# Patient Record
Sex: MALE | Race: WHITE | NOT HISPANIC OR LATINO | Employment: FULL TIME | URBAN - NONMETROPOLITAN AREA
[De-identification: names, ages, dates, MRNs, and addresses within clinical notes are randomized per-mention and may not be internally consistent; named-entity substitution may affect disease eponyms.]

---

## 2017-01-25 ENCOUNTER — OFFICE VISIT (OUTPATIENT)
Dept: CARDIOLOGY | Facility: CLINIC | Age: 67
End: 2017-01-25

## 2017-01-25 VITALS
HEIGHT: 69 IN | SYSTOLIC BLOOD PRESSURE: 132 MMHG | WEIGHT: 159 LBS | DIASTOLIC BLOOD PRESSURE: 90 MMHG | HEART RATE: 59 BPM | BODY MASS INDEX: 23.55 KG/M2

## 2017-01-25 DIAGNOSIS — I10 ESSENTIAL HYPERTENSION: Primary | ICD-10-CM

## 2017-01-25 DIAGNOSIS — R07.9 CHEST PAIN, UNSPECIFIED TYPE: ICD-10-CM

## 2017-01-25 PROCEDURE — 93000 ELECTROCARDIOGRAM COMPLETE: CPT | Performed by: INTERNAL MEDICINE

## 2017-01-25 PROCEDURE — 99203 OFFICE O/P NEW LOW 30 MIN: CPT | Performed by: INTERNAL MEDICINE

## 2017-01-25 RX ORDER — AMLODIPINE BESYLATE 10 MG/1
10 TABLET ORAL DAILY
Qty: 90 TABLET | Refills: 4 | OUTPATIENT
Start: 2017-01-25 | End: 2021-07-08

## 2017-01-25 NOTE — PROGRESS NOTES
[facility]  OFFICE NOTE    Chas Andino  66 y.o. male    01/25/2017  1. 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  He had bowel obstruction partial in the summertime and was in the hospital and was relieved without any surgical measures.  He had 4 episodes in the past  2 years.  Never had any abdominal surgery.  He had atypical type of chest pain never had any stress test within the last 5 years.  Never had any cardiac problems in the past.  His father had congestive heart failure and hypertension along with peripheral vascular disease.  No history of diabetes in the family.        No Known Allergies      Past Medical History   Diagnosis Date   • Hypertension          Past Surgical History   Procedure Laterality Date   • Total knee arthroplasty           Family History   Problem Relation Age of Onset   • Hypertension Father    • Heart disease Father          Social History     Social History   • Marital status:      Spouse name: N/A   • Number of children: N/A   • Years of education: N/A     Occupational History   • Not on file.     Social History Main Topics   • Smoking status: Never Smoker   • Smokeless tobacco: Never Used   • Alcohol use No      Comment: social   • Drug use: No   • Sexual activity: Defer     Other Topics Concern   • Not on file     Social History Narrative   • No narrative on file         Current Outpatient Prescriptions   Medication Sig Dispense Refill   • amLODIPine (NORVASC) 10 MG tablet Take 1 tablet by mouth Daily. 90 tablet 4   • zolpidem (AMBIEN) 10 MG tablet Take 1 tablet by mouth Every Night.     • lisinopril 10 MG tablet 20 mg, hydrochlorothiazide 25 MG  "tablet 12.5 mg 20/12.5 mg 90 capsule 4     No current facility-administered medications for this visit.          Review of Systems     Constitution: Denies any fatigue, fever or chills    HENT: Denies any headache, hearing impairment,     Eyes: Denies any blurring of vision, or photophobia     Cardivascular - As per history of present illness     Respiratory system-denies any COPD, shortness of breath,   sleep apnea.     Endocrine:  No history of hyperlipidemia, diabetes,                      Hypothyrodism       Musculoskeletal:   history of arthritis with musculoskeletal problems with knee replacement    Gastrointestinal: No nausea, vomiting, or melena    Genitourinary: No dysuria or hematuria    Neurological:   No history of seizure disorder, stroke, memory problems    Psychiatric/Behavioral:        No history of depression,  No history of bipolar disorder or schizophrenia     Hematological- no history of easy bruising or any bleeding diathesis            OBJECTIVE    Visit Vitals   • /90   • Pulse 59   • Ht 69\" (175.3 cm)   • Wt 159 lb (72.1 kg)   • BMI 23.48 kg/m2         Physical Exam     Constitutional: is oriented to person, place, and time.     Skin-warm and dry    Well developed and nourished in no acute distress      Head: Normocephalic and atraumatic.     Eyes: Pupils are equal, round, and reactive to light.     Neck: Neck supple. No bruit in the carotids, no elevation of JVD    Cardiovascular: [place] in the fifth intercostal space   Regular rate, and  Rhythm,    S1 greater than S2, no S3 or S4, no gallop     Pulmonary/Chest:   Air  Entry is equal on both sides  No wheezing or crackles,      Abdominal: Soft.  No hepatosplenomegaly, bowel sounds are present    Musculoskeletal: No kyphoscoliosis, no significant thickening of the joints    Neurological: is alert and oriented to person, place, and time.    cranial nerve are intact .   No motor or sensory deficit    Extremities-no edema, no radial femoral " delay      Psychiatric: He has a normal mood and affect.                  His behavior is normal.             ECG 12 Lead  Date/Time: 1/25/2017 1:57 PM  Performed by: SUSANNE VASQUEZ  Authorized by: SUSANNE VASQUEZ   Comparison: not compared with previous ECG   Rhythm: sinus rhythm and sinus bradycardia  Clinical impression: normal ECG              Lab Results   Component Value Date    WBC 10.2 (H) 10/02/2016    HGB 14.3 10/02/2016    HCT 40.8 10/02/2016    MCV 92.9 10/02/2016     10/02/2016     Lab Results   Component Value Date    GLUCOSE 90 10/02/2016    BUN 14 10/02/2016    CREATININE 0.9 10/02/2016    CO2 28 10/02/2016    CALCIUM 9.1 10/02/2016    ALBUMIN 4.8 10/01/2016    AST 30 10/01/2016    ALT 32 10/01/2016                  A/P    Labile hypertension, was on Bystolic and amlodipine, feeling tired with fatigue with bradycardia with Bystolic.  Has been on Zestoretic before.  And had been on amlodipine 10 mg daily.  I stopped the Bystolic and added Zestoretic 20/12.5 mg daily in the morning and amlodipine 10 mg in the evening and will monitor the blood pressure for the next 2 weeks.    Long-standing history of hypertension we'll check an echo to assess LV function and left ventricular thickness.  Will also get a exercise treadmill to assess functional capacity as he had atypical chest pain probably related to labile hypertension.  Will get labs regarding the LFTs electrolytes and lipid panel.  Will monitor the blood pressure for the next 2 weeks and follow-up in 3 weeks      Susanne Vasquez MD  1/25/2017  1:52 PM    EMR Dragon/Transcription disclaimer:   Some of this note may be an electronic transcription/translation of spoken language to printed text. The electronic translation of spoken language may permit erroneous, or at times, nonsensical words or phrases to be inadvertently transcribed; Although I have reviewed the note for such errors, some may still exist.

## 2017-01-25 NOTE — MR AVS SNAPSHOT
Chas Andino   1/25/2017 1:15 PM   Office Visit    Dept Phone:  [phone]   Encounter #:  [id number]    Provider:  Philippe Petersen MD   Department:  [facility] CARDIOLOGY                Your Full Care Plan              Today's Medication Changes          These changes are accurate as of: 1/25/17  1:55 PM.  If you have any questions, ask your nurse or doctor.               New Medication(s)Ordered:     lisinopril 10 MG tablet 20 mg, hydrochlorothiazide 25 MG tablet 12.5 mg   20/12.5 mg   Started by:  Philippe Petersen MD         Medication(s)that have changed:     amLODIPine 10 MG tablet   Commonly known as:  NORVASC   Take 1 tablet by mouth Daily.   What changed:  when to take this   Changed by:  Philippe Petersen MD         Stop taking medication(s)listed here:     BYSTOLIC 10 MG tablet   Generic drug:  nebivolol   Stopped by:  Philippe Petersen MD           BYSTOLIC 20 MG tablet   Generic drug:  nebivolol   Stopped by:  Philippe Petersen MD           hydrochlorothiazide 25 MG tablet   Commonly known as:  HYDRODIURIL   Stopped by:  Philippe Petersen MD           HYDROcodone-acetaminophen 5-325 MG per tablet   Commonly known as:  NORCO   Stopped by:  Philippe Petersen MD           lisinopril 20 MG tablet   Commonly known as:  PRINIVIL,ZESTRIL   Stopped by:  Philippe Petersen MD           predniSONE 20 MG tablet   Commonly known as:  DELTASONE   Stopped by:  Philippe Petersen MD                Where to Get Your Medications      These medications were sent to [facility] [place] [id number] - [phone]  - [phone] [address] [address]     Phone:  [phone]     amLODIPine 10 MG tablet    lisinopril 10 MG tablet 20 mg, hydrochlorothiazide 25 MG tablet 12.5 mg                  Your Updated Medication List          This list is accurate as of: 1/25/17  1:55 PM.  Always use your most recent med list.                 amLODIPine 10 MG tablet   Commonly known as:  NORVASC   Take 1 tablet by mouth Daily.       lisinopril 10 MG tablet 20 mg, hydrochlorothiazide 25 MG tablet 12.5 mg   20/12.5 mg       zolpidem 10 MG tablet   Commonly known as:  AMBIEN               We Performed the Following     Adult Transthoracic Echo Complete     CBC (No Diff)     Comprehensive Metabolic Panel     Lipid Panel     T4     Treadmill Stress Test     Uric Acid       You Were Diagnosed With        Codes Comments    Essential hypertension    -  Primary ICD-10-CM: I10  ICD-9-CM: 401.9       Instructions     None    Patient Instructions History      Upcoming Appointments     Visit Type Date Time Department    NEW PATIENT 2017  1:15 PM [facility] HEART [facility] STRESS TEST ONLY [place] 2017  3:30 PM [facility] ECHO 2D COMPLETE [place] 2017  3:00 PM [facility]    OFFICE VISIT 3/1/2017  1:30 PM [facility]      [employer] Signup     [facility] [employer] allows you to send messages to your doctor, view your test results, renew your prescriptions, schedule appointments, and more. To sign up, go to [employer] and click on the Sign Up Now link in the New User? box. Enter your [employer] Activation Code exactly as it appears below along with the last four digits of your Social Security Number and your Date of Birth () to complete the sign-up process. If you do not sign up before the expiration date, you must request a new code.    [employer] Activation Code: [email or web address]  Expires: 2017  1:55 PM    If you have questions, you can email [email or web address] or call [phone] to talk to our [employer] staff. Remember, [employer] is NOT to be used for urgent needs. For medical emergencies, dial 911.               Other Info from Your Visit           Your Appointments     2017  3:00 PM CST   [facility] [facility]OLOGY (--)    [address]  [facility] [address] [phone]           Bring your insurance cards with you. Wear comfortable clothing and shoes.            Feb 22, 2017  3:30 PM CST   STRESS TEST ONLY VISIT with Philippe Petersen MD   [facility] CARDIOLOGY (--)    [address]  [facility] [address] [phone]           No food or drink for 2 hours prior to your test. No caffeine or chocolate 24 hours prior to you test. (this includes coffee, tea, soda, all decaffeinated beverages, cappuccino flavorings, noooz or vivarian, diet medications, excedrin, anacin or any energy drinks) wear comfortable clothing and walking shoes. Bring your insurance cards with you. Bring all medications in their original bottles. If you are diabetic, do not take your diabetic medications after consulting with your primary care physician. if you take insulin, only take half the dose after consulting with your primary care physician. please hold the following medications for 48 hours prior to your test after consulting with your primary care physician. (acebutolo, aggrenox, atenolol, bisoprolol, betaxolol, betapace, calan, cardizem, carvadilol, coreg, corgard, corzide, dilacor xr, diltiazem, inderal, inderal la, isoptin, karlone, labetolol, levatol, nadolol, normodyne, penbutolol, pindolol, propanolol, sectral, sotalol, tenoretic, tiazic, timolol, bystolic, toprol xl, lopressor, metoprolol, trandata, verapamil, verelan, visken, zebeta, zisc)            Mar 01, 2017  1:30 PM CST   Office Visit with Philippe Petersen MD   [facility] CARDIOLOGY (--)    [address] [address] [phone]           Arrive 15 minutes prior to appointment.              Allergies     No Known Allergies      Vital Signs     Blood Pressure Pulse Height Weight Body Mass Index Smoking Status    132/90 59 69\" (175.3 cm) 159 lb (72.1 kg) 23.48 " kg/m2 Never Smoker      Problems and Diagnoses Noted     High blood pressure

## 2017-02-21 ENCOUNTER — APPOINTMENT (OUTPATIENT)
Dept: CT IMAGING | Facility: HOSPITAL | Age: 67
End: 2017-02-21

## 2017-02-21 ENCOUNTER — HOSPITAL ENCOUNTER (EMERGENCY)
Facility: HOSPITAL | Age: 67
Discharge: HOME | End: 2017-02-21
Attending: EMERGENCY MEDICINE | Admitting: EMERGENCY MEDICINE

## 2017-02-21 ENCOUNTER — APPOINTMENT (OUTPATIENT)
Dept: GENERAL RADIOLOGY | Facility: HOSPITAL | Age: 67
End: 2017-02-21

## 2017-02-21 VITALS
HEIGHT: 69 IN | HEART RATE: 70 BPM | BODY MASS INDEX: 23.7 KG/M2 | RESPIRATION RATE: 18 BRPM | SYSTOLIC BLOOD PRESSURE: 126 MMHG | TEMPERATURE: 97.6 F | DIASTOLIC BLOOD PRESSURE: 66 MMHG | OXYGEN SATURATION: 98 % | WEIGHT: 160 LBS

## 2017-02-21 DIAGNOSIS — J32.9 SINUSITIS, UNSPECIFIED CHRONICITY, UNSPECIFIED LOCATION: ICD-10-CM

## 2017-02-21 DIAGNOSIS — R42 VERTIGO: Primary | ICD-10-CM

## 2017-02-21 LAB
ALBUMIN SERPL-MCNC: 4.5 G/DL (ref 3.4–4.8)
ALBUMIN/GLOB SERPL: 1.5 G/DL (ref 1.1–1.8)
ALP SERPL-CCNC: 39 U/L (ref 38–126)
ALT SERPL W P-5'-P-CCNC: 28 U/L (ref 21–72)
ANION GAP SERPL CALCULATED.3IONS-SCNC: 10 MMOL/L (ref 5–15)
AST SERPL-CCNC: 27 U/L (ref 17–59)
BASOPHILS # BLD AUTO: 0.02 10*3/MM3 (ref 0–0.2)
BASOPHILS NFR BLD AUTO: 0.3 % (ref 0–2)
BILIRUB SERPL-MCNC: 0.7 MG/DL (ref 0.2–1.3)
BUN BLD-MCNC: 14 MG/DL (ref 7–21)
BUN/CREAT SERPL: 17.1 (ref 7–25)
CALCIUM SPEC-SCNC: 9.4 MG/DL (ref 8.4–10.2)
CHLORIDE SERPL-SCNC: 88 MMOL/L (ref 95–110)
CO2 SERPL-SCNC: 30 MMOL/L (ref 22–31)
CREAT BLD-MCNC: 0.82 MG/DL (ref 0.7–1.3)
DEPRECATED RDW RBC AUTO: 37.7 FL (ref 35.1–43.9)
EOSINOPHIL # BLD AUTO: 0.04 10*3/MM3 (ref 0–0.7)
EOSINOPHIL NFR BLD AUTO: 0.6 % (ref 0–7)
ERYTHROCYTE [DISTWIDTH] IN BLOOD BY AUTOMATED COUNT: 11.6 % (ref 11.5–14.5)
GFR SERPL CREATININE-BSD FRML MDRD: 94 ML/MIN/1.73 (ref 49–113)
GLOBULIN UR ELPH-MCNC: 3.1 GM/DL (ref 2.3–3.5)
GLUCOSE BLD-MCNC: 90 MG/DL (ref 60–100)
HCT VFR BLD AUTO: 41 % (ref 39–49)
HGB BLD-MCNC: 14.9 G/DL (ref 13.7–17.3)
HOLD SPECIMEN: NORMAL
HOLD SPECIMEN: NORMAL
IMM GRANULOCYTES # BLD: 0.03 10*3/MM3 (ref 0–0.02)
IMM GRANULOCYTES NFR BLD: 0.5 % (ref 0–0.5)
LYMPHOCYTES # BLD AUTO: 0.92 10*3/MM3 (ref 0.6–4.2)
LYMPHOCYTES NFR BLD AUTO: 14.3 % (ref 10–50)
MCH RBC QN AUTO: 32.2 PG (ref 26.5–34)
MCHC RBC AUTO-ENTMCNC: 36.3 G/DL (ref 31.5–36.3)
MCV RBC AUTO: 88.6 FL (ref 80–98)
MONOCYTES # BLD AUTO: 0.53 10*3/MM3 (ref 0–0.9)
MONOCYTES NFR BLD AUTO: 8.2 % (ref 0–12)
NEUTROPHILS # BLD AUTO: 4.9 10*3/MM3 (ref 2–8.6)
NEUTROPHILS NFR BLD AUTO: 76.1 % (ref 37–80)
PLATELET # BLD AUTO: 323 10*3/MM3 (ref 150–450)
PMV BLD AUTO: 9.1 FL (ref 8–12)
POTASSIUM BLD-SCNC: 3.8 MMOL/L (ref 3.5–5.1)
PROT SERPL-MCNC: 7.6 G/DL (ref 6.3–8.6)
RBC # BLD AUTO: 4.63 10*6/MM3 (ref 4.37–5.74)
SODIUM BLD-SCNC: 128 MMOL/L (ref 137–145)
WBC NRBC COR # BLD: 6.44 10*3/MM3 (ref 3.2–9.8)
WHOLE BLOOD HOLD SPECIMEN: NORMAL
WHOLE BLOOD HOLD SPECIMEN: NORMAL

## 2017-02-21 PROCEDURE — 80053 COMPREHEN METABOLIC PANEL: CPT | Performed by: EMERGENCY MEDICINE

## 2017-02-21 PROCEDURE — 99284 EMERGENCY DEPT VISIT MOD MDM: CPT

## 2017-02-21 PROCEDURE — 70450 CT HEAD/BRAIN W/O DYE: CPT

## 2017-02-21 PROCEDURE — 93010 ELECTROCARDIOGRAM REPORT: CPT | Performed by: INTERNAL MEDICINE

## 2017-02-21 PROCEDURE — 93005 ELECTROCARDIOGRAM TRACING: CPT | Performed by: EMERGENCY MEDICINE

## 2017-02-21 PROCEDURE — 71010 HC CHEST PA OR AP: CPT

## 2017-02-21 PROCEDURE — 85025 COMPLETE CBC W/AUTO DIFF WBC: CPT | Performed by: EMERGENCY MEDICINE

## 2017-02-21 RX ORDER — AZITHROMYCIN 250 MG/1
TABLET, FILM COATED ORAL
Qty: 6 TABLET | Refills: 0 | Status: SHIPPED | OUTPATIENT
Start: 2017-02-21 | End: 2017-03-01

## 2017-02-21 RX ORDER — SODIUM CHLORIDE 0.9 % (FLUSH) 0.9 %
10 SYRINGE (ML) INJECTION AS NEEDED
Status: DISCONTINUED | OUTPATIENT
Start: 2017-02-21 | End: 2017-02-21 | Stop reason: HOSPADM

## 2017-02-21 RX ORDER — MECLIZINE HYDROCHLORIDE 25 MG/1
25 TABLET ORAL 3 TIMES DAILY PRN
Qty: 15 TABLET | Refills: 0 | Status: SHIPPED | OUTPATIENT
Start: 2017-02-21 | End: 2017-03-01

## 2017-02-21 RX ORDER — MECLIZINE HYDROCHLORIDE 25 MG/1
25 TABLET ORAL ONCE
Status: COMPLETED | OUTPATIENT
Start: 2017-02-21 | End: 2017-02-21

## 2017-02-21 RX ADMIN — MECLIZINE HYDROCHLORIDE 25 MG: 25 TABLET ORAL at 12:43

## 2017-02-21 NOTE — ED PROVIDER NOTES
Subjective   Patient is a 66 y.o. male presenting with dizziness.   Dizziness   Quality:  Head spinning  Severity:  Severe  Onset quality:  Sudden  Duration:  1 month  Timing:  Intermittent  Progression:  Waxing and waning  Chronicity:  Recurrent  Context: head movement and physical activity    Context: not when bending over, not with bowel movement, not with ear pain, not with eye movement, not with inactivity, not with loss of consciousness, not with medication, not when standing up and not when urinating    Relieved by:  Nothing  Worsened by:  Movement  Ineffective treatments:  None tried  Associated symptoms: no blood in stool, no chest pain, no diarrhea, no headaches, no hearing loss, no nausea, no palpitations, no shortness of breath, no syncope, no tinnitus, no vision changes, no vomiting and no weakness    Risk factors: no anemia, no heart disease, no hx of stroke, no hx of vertigo, no Meniere's disease, no multiple medications and no new medications        Review of Systems   Constitutional: Negative.    HENT: Negative.  Negative for hearing loss and tinnitus.    Eyes: Negative.    Respiratory: Negative.  Negative for shortness of breath.    Cardiovascular: Negative.  Negative for chest pain, palpitations and syncope.   Gastrointestinal: Negative.  Negative for blood in stool, diarrhea, nausea and vomiting.   Musculoskeletal: Negative.    Skin: Negative.    Neurological: Positive for dizziness. Negative for weakness and headaches.       Past Medical History   Diagnosis Date   • Bowel obstruction    • Cervical cyst      C7   • Hypertension        No Known Allergies    Past Surgical History   Procedure Laterality Date   • Total knee arthroplasty         Family History   Problem Relation Age of Onset   • Hypertension Father    • Heart disease Father        Social History     Social History   • Marital status:      Spouse name: N/A   • Number of children: N/A   • Years of education: N/A     Social  History Main Topics   • Smoking status: Never Smoker   • Smokeless tobacco: Never Used   • Alcohol use No      Comment: social   • Drug use: No   • Sexual activity: Defer     Other Topics Concern   • None     Social History Narrative           Objective   Physical Exam   Constitutional: He is oriented to person, place, and time. He appears well-developed and well-nourished.   HENT:   Head: Normocephalic and atraumatic.   Mouth/Throat: Oropharynx is clear and moist.   Eyes: Conjunctivae and EOM are normal. Pupils are equal, round, and reactive to light.   Neck: Normal range of motion. Neck supple.   Cardiovascular: Normal rate, regular rhythm and normal heart sounds.  Exam reveals no gallop and no friction rub.    No murmur heard.  Pulmonary/Chest: Effort normal and breath sounds normal. He has no wheezes. He has no rales.   Abdominal: Soft. Bowel sounds are normal. He exhibits no mass. There is no tenderness. There is no rebound and no guarding.   Musculoskeletal: Normal range of motion. He exhibits no tenderness.   Neurological: He is alert and oriented to person, place, and time. He has normal reflexes. No cranial nerve deficit.   Skin: Skin is warm and dry.   Nursing note and vitals reviewed.      ECG 12 Lead    Date/Time: 2/21/2017 9:34 AM  Performed by: CHERYL BUTLER  Authorized by: CHERYL BUTLER   Interpreted by physician  Comparison: not compared with previous ECG   Rhythm: sinus rhythm  Rate: normal  QRS axis: normal  Conduction: conduction normal  ST Segments: ST segments normal  T Waves: T waves normal  Other: no other findings  Clinical impression: normal ECG               ED Course  ED Course      Labs Reviewed   COMPREHENSIVE METABOLIC PANEL - Abnormal; Notable for the following:        Result Value    Sodium 128 (*)     Chloride 88 (*)     All other components within normal limits   CBC WITH AUTO DIFFERENTIAL - Abnormal; Notable for the following:     Immature Grans, Absolute 0.03 (*)     All other  components within normal limits   RAINBOW DRAW    Narrative:     The following orders were created for panel order [place] Draw.  Procedure                               Abnormality         Status                     ---------                               -----------         ------                     Light Blue Top[[id number]]                                    Final result               Green Top (Gel)[[id number]]                                   Final result               Lavender Top[[id number]]                                      Final result               Gold Top - SST[[id number]]                                    Final result                 Please view results for these tests on the individual orders.   CBC AND DIFFERENTIAL    Narrative:     The following orders were created for panel order CBC & Differential.  Procedure                               Abnormality         Status                     ---------                               -----------         ------                     CBC Auto Differential[[id number]]         Abnormal            Final result                 Please view results for these tests on the individual orders.   LIGHT BLUE TOP   GREEN TOP   LAVENDER TOP   GOLD TOP - SST       CT Head Without Contrast   Final Result   No evidence of intracranial hemorrhage, mass effect or large   acute infarct.   Left ethmoid sinus disease.      Electronically signed by:  Paul Rodriguez MD  2/21/2017 10:21 AM   CST Workstation: [id number]      XR Chest 1 View   Final Result   CONCLUSION:   No acute pulmonary disease.      Electronically signed by:  Paul Rodriguez MD  2/21/2017 10:01 AM   CST Workstation: [id number]        Labs Reviewed   COMPREHENSIVE METABOLIC PANEL - Abnormal; Notable for the following:        Result Value    Sodium 128 (*)     Chloride 88 (*)     All other components within normal limits   CBC WITH AUTO DIFFERENTIAL - Abnormal; Notable for the following:     Immature Grans, Absolute 0.03  (*)     All other components within normal limits   RAINBOW DRAW    Narrative:     The following orders were created for panel order [place] Draw.  Procedure                               Abnormality         Status                     ---------                               -----------         ------                     Light Blue Top[[id number]]                                    Final result               Green Top (Gel)[[id number]]                                   Final result               Lavender Top[[id number]]                                      Final result               Gold Top - SST[[id number]]                                    Final result                 Please view results for these tests on the individual orders.   CBC AND DIFFERENTIAL    Narrative:     The following orders were created for panel order CBC & Differential.  Procedure                               Abnormality         Status                     ---------                               -----------         ------                     CBC Auto Differential[[id number]]         Abnormal            Final result                 Please view results for these tests on the individual orders.   LIGHT BLUE TOP   GREEN TOP   LAVENDER TOP   GOLD TOP - SST       CT Head Without Contrast   Final Result   No evidence of intracranial hemorrhage, mass effect or large   acute infarct.   Left ethmoid sinus disease.      Electronically signed by:  Paul Rodriguez MD  2/21/2017 10:21 AM   CST Workstation: [id number]      XR Chest 1 View   Final Result   CONCLUSION:   No acute pulmonary disease.      Electronically signed by:  Paul Rodriguez MD  2/21/2017 10:01 AM   CST Workstation: [id number]      spoke with Dr Davis and reviewed findings.  Pt to have MRI tomorrow          MDM      Final diagnoses:   Vertigo   Sinusitis, unspecified chronicity, unspecified location            Hector Oshea MD  03/1950

## 2017-02-22 LAB
BH CV ECHO MEAS - ACS: 1.6 CM
BH CV ECHO MEAS - AO MAX PG (FULL): 2.3 MMHG
BH CV ECHO MEAS - AO MAX PG: 8.5 MMHG
BH CV ECHO MEAS - AO MEAN PG (FULL): 2 MMHG
BH CV ECHO MEAS - AO MEAN PG: 4 MMHG
BH CV ECHO MEAS - AO ROOT AREA (BSA CORRECTED): 1.5
BH CV ECHO MEAS - AO ROOT AREA: 6.6 CM^2
BH CV ECHO MEAS - AO ROOT DIAM: 2.9 CM
BH CV ECHO MEAS - AO V2 MAX: 146 CM/SEC
BH CV ECHO MEAS - AO V2 MEAN: 87.8 CM/SEC
BH CV ECHO MEAS - AO V2 VTI: 29 CM
BH CV ECHO MEAS - BSA(HAYCOCK): 1.9 M^2
BH CV ECHO MEAS - BSA: 1.9 M^2
BH CV ECHO MEAS - BZI_BMI: 23.6 KILOGRAMS/M^2
BH CV ECHO MEAS - BZI_METRIC_HEIGHT: 175.3 CM
BH CV ECHO MEAS - BZI_METRIC_WEIGHT: 72.6 KG
BH CV ECHO MEAS - EDV(CUBED): 140.6 ML
BH CV ECHO MEAS - EDV(TEICH): 129.5 ML
BH CV ECHO MEAS - EF(CUBED): 64 %
BH CV ECHO MEAS - EF(TEICH): 55.1 %
BH CV ECHO MEAS - EPSS: 0.8 CM
BH CV ECHO MEAS - ESV(CUBED): 50.7 ML
BH CV ECHO MEAS - ESV(TEICH): 58.1 ML
BH CV ECHO MEAS - FS: 28.8 %
BH CV ECHO MEAS - IVS/LVPW: 0.69
BH CV ECHO MEAS - IVSD: 0.9 CM
BH CV ECHO MEAS - LA DIMENSION: 4.1 CM
BH CV ECHO MEAS - LA/AO: 1.4
BH CV ECHO MEAS - LV MASS(C)D: 220.8 GRAMS
BH CV ECHO MEAS - LV MASS(C)DI: 117.5 GRAMS/M^2
BH CV ECHO MEAS - LV MAX PG: 6.3 MMHG
BH CV ECHO MEAS - LV MEAN PG: 2 MMHG
BH CV ECHO MEAS - LV V1 MAX: 125 CM/SEC
BH CV ECHO MEAS - LV V1 MEAN: 66.4 CM/SEC
BH CV ECHO MEAS - LV V1 VTI: 24.6 CM
BH CV ECHO MEAS - LVIDD: 5.2 CM
BH CV ECHO MEAS - LVIDS: 3.7 CM
BH CV ECHO MEAS - LVPWD: 1.3 CM
BH CV ECHO MEAS - MR MAX PG: 97.2 MMHG
BH CV ECHO MEAS - MR MAX VEL: 493 CM/SEC
BH CV ECHO MEAS - MV A MAX VEL: 116 CM/SEC
BH CV ECHO MEAS - MV E MAX VEL: 81.4 CM/SEC
BH CV ECHO MEAS - MV E/A: 0.7
BH CV ECHO MEAS - PA MAX PG: 3.7 MMHG
BH CV ECHO MEAS - PA MEAN PG: 2 MMHG
BH CV ECHO MEAS - PA V2 MAX: 96.5 CM/SEC
BH CV ECHO MEAS - PA V2 MEAN: 57.2 CM/SEC
BH CV ECHO MEAS - PA V2 VTI: 19.1 CM
BH CV ECHO MEAS - RAP SYSTOLE: 10 MMHG
BH CV ECHO MEAS - RVDD: 1.5 CM
BH CV ECHO MEAS - RVSP: 31.1 MMHG
BH CV ECHO MEAS - SI(AO): 102 ML/M^2
BH CV ECHO MEAS - SI(CUBED): 47.9 ML/M^2
BH CV ECHO MEAS - SI(TEICH): 38 ML/M^2
BH CV ECHO MEAS - SV(AO): 191.6 ML
BH CV ECHO MEAS - SV(CUBED): 90 ML
BH CV ECHO MEAS - SV(TEICH): 71.4 ML
BH CV ECHO MEAS - TR MAX VEL: 218.5 CM/SEC
BH CV STRESS DURATION MIN STAGE 1: 3
BH CV STRESS DURATION SEC STAGE 1: 0
BH CV STRESS GRADE STAGE 1: 10
BH CV STRESS METS STAGE 1: 5
BH CV STRESS PROTOCOL 1: NORMAL
BH CV STRESS RECOVERY BP: NORMAL MMHG
BH CV STRESS RECOVERY HR: 86 BPM
BH CV STRESS SPEED STAGE 1: 1.7
BH CV STRESS STAGE 1: 1
LV EF 2D ECHO EST: 60 %
MAXIMAL PREDICTED HEART RATE: 155 BPM
PERCENT MAX PREDICTED HR: 80.65 %
STRESS BASELINE BP: NORMAL MMHG
STRESS BASELINE HR: 81 BPM
STRESS PERCENT HR: 95 %
STRESS POST PEAK BP: NORMAL MMHG
STRESS POST PEAK HR: 125 BPM
STRESS TARGET HR: 132 BPM

## 2017-03-01 ENCOUNTER — OFFICE VISIT (OUTPATIENT)
Dept: CARDIOLOGY | Facility: CLINIC | Age: 67
End: 2017-03-01

## 2017-03-01 VITALS
HEART RATE: 64 BPM | HEIGHT: 69 IN | WEIGHT: 157 LBS | SYSTOLIC BLOOD PRESSURE: 118 MMHG | DIASTOLIC BLOOD PRESSURE: 64 MMHG | BODY MASS INDEX: 23.25 KG/M2

## 2017-03-01 DIAGNOSIS — I10 ESSENTIAL HYPERTENSION: Primary | ICD-10-CM

## 2017-03-01 PROCEDURE — 99212 OFFICE O/P EST SF 10 MIN: CPT | Performed by: INTERNAL MEDICINE

## 2017-03-01 NOTE — PROGRESS NOTES
[facility]  OFFICE NOTE    Chas Andino  66 y.o. male    03/01/2017  1. 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  And I had stent adjusted his blood pressure medicine and blood pressure staying good with Aldactazide half a tablet in the morning along with lisinopril 40 mg in the morning and amlodipine 10 mill grams the evening.  He had his kidney function checked on 21st of this month.  We'll check another BMP in 1 week as he is on Aldactazide.  If that is okay then we'll continue Aldactazide regularly.              No Known Allergies      Past Medical History   Diagnosis Date   • Bowel obstruction    • Cervical cyst      C7   • Hypertension          Past Surgical History   Procedure Laterality Date   • Total knee arthroplasty           Family History   Problem Relation Age of Onset   • Hypertension Father    • Heart disease Father          Social History     Social History   • Marital status:      Spouse name: N/A   • Number of children: N/A   • Years of education: N/A     Occupational History   • Not on file.     Social History Main Topics   • Smoking status: Never Smoker   • Smokeless tobacco: Never Used   • Alcohol use No      Comment: social   • Drug use: No   • Sexual activity: Defer     Other Topics Concern   • Not on file     Social History Narrative         Current Outpatient Prescriptions   Medication Sig Dispense Refill   • amLODIPine (NORVASC) 10 MG tablet Take 1 tablet by mouth Daily. 90 tablet 4   • lisinopril (PRINIVIL,ZESTRIL) 40 MG tablet Take 1 tablet by mouth Daily. 30 tablet 11   • spironolactone-hydrochlorothiazide (ALDACTAZIDE) 25-25 MG tablet Take 0.5 tablets by mouth Daily. 30 tablet 12   • Vortioxetine HBr (TRINTELLIX PO) Take  by mouth.     • zolpidem (AMBIEN) 10 MG tablet Take 1 tablet by mouth Every Night.    "    No current facility-administered medications for this visit.          Review of Systems     Constitution: Denies any fatigue, fever or chills    HENT: Denies any headache, hearing impairment,     Eyes: Denies any blurring of vision, or photophobia     Cardivascular - As per history of present illness     Respiratory system-denies any COPD, shortness of breath,   sleep apnea.     Endocrine:  No history of hyperlipidemia, diabetes,                      Hypothyrodism       Musculoskeletal:  No history of arthritis with musculoskeletal problems    Gastrointestinal: No nausea, vomiting, or melena    Genitourinary: No dysuria or hematuria    Neurological:   No history of seizure disorder, stroke, memory problems    Psychiatric/Behavioral:        No history of depression,  No history of bipolar disorder or schizophrenia     Hematological- no history of easy bruising or any bleeding diathesis            OBJECTIVE    Visit Vitals   • /64   • Pulse 64   • Ht 69\" (175.3 cm)   • Wt 157 lb (71.2 kg)   • BMI 23.18 kg/m2         Physical Exam     Constitutional: is oriented to person, place, and time.     Skin-warm and dry    Well developed and nourished in no acute distress      Head: Normocephalic and atraumatic.     Eyes: Pupils are equal, round, and reactive to light.     Neck: Neck supple. No bruit in the carotids, no elevation of JVD    Cardiovascular: [place] in the fifth intercostal space   Regular rate, and  Rhythm,    S1 greater than S2, no S3 or S4, no gallop     Pulmonary/Chest:   Air  Entry is equal on both sides  No wheezing or crackles,      Abdominal: Soft.  No hepatosplenomegaly, bowel sounds are present    Musculoskeletal: No kyphoscoliosis, no significant thickening of the joints    Neurological: is alert and oriented to person, place, and time.    cranial nerve are intact .   No motor or sensory deficit    Extremities-no edema, no radial femoral delay      Psychiatric: He has a normal mood and affect.    "               His behavior is normal.           Procedures      Lab Results   Component Value Date    WBC 6.44 02/21/2017    HGB 14.9 02/21/2017    HCT 41.0 02/21/2017    MCV 88.6 02/21/2017     02/21/2017     Lab Results   Component Value Date    GLUCOSE 90 02/21/2017    BUN 14 02/21/2017    CREATININE 0.82 02/21/2017    EGFRIFNONA 94 02/21/2017    BCR 17.1 02/21/2017    CO2 30.0 02/21/2017    CALCIUM 9.4 02/21/2017    ALBUMIN 4.50 02/21/2017    LABIL2 1.5 02/21/2017    AST 27 02/21/2017    ALT 28 02/21/2017                  A/P    Hypertension-well controlled with Aldactazide half a tablet, lisinopril 40 mg and amlodipine 10 mg in the evening.  We'll continue the current medications and he'll follow with me as necessary.  Schedule for a BMP in 1 week.      Philippe Petersen MD  3/1/2017  2:06 PM    EMR Dragon/Transcription disclaimer:   Some of this note may be an electronic transcription/translation of spoken language to printed text. The electronic translation of spoken language may permit erroneous, or at times, nonsensical words or phrases to be inadvertently transcribed; Although I have reviewed the note for such errors, some may still exist.

## 2017-03-17 ENCOUNTER — APPOINTMENT (OUTPATIENT)
Dept: LAB | Facility: HOSPITAL | Age: 67
End: 2017-03-17

## 2017-03-17 DIAGNOSIS — I10 ESSENTIAL HYPERTENSION: Primary | ICD-10-CM

## 2017-03-17 LAB
ANION GAP SERPL CALCULATED.3IONS-SCNC: 10 MMOL/L (ref 5–15)
BUN BLD-MCNC: 27 MG/DL (ref 7–21)
BUN/CREAT SERPL: 29.7 (ref 7–25)
CALCIUM SPEC-SCNC: 9.3 MG/DL (ref 8.4–10.2)
CHLORIDE SERPL-SCNC: 86 MMOL/L (ref 95–110)
CO2 SERPL-SCNC: 24 MMOL/L (ref 22–31)
CREAT BLD-MCNC: 0.91 MG/DL (ref 0.7–1.3)
GFR SERPL CREATININE-BSD FRML MDRD: 84 ML/MIN/1.73 (ref 49–113)
GLUCOSE BLD-MCNC: 79 MG/DL (ref 60–100)
POTASSIUM BLD-SCNC: 5.3 MMOL/L (ref 3.5–5.1)
SODIUM BLD-SCNC: 120 MMOL/L (ref 137–145)

## 2017-03-17 PROCEDURE — 80048 BASIC METABOLIC PNL TOTAL CA: CPT | Performed by: INTERNAL MEDICINE

## 2017-03-17 PROCEDURE — 36415 COLL VENOUS BLD VENIPUNCTURE: CPT | Performed by: INTERNAL MEDICINE

## 2017-03-22 ENCOUNTER — DOCUMENTATION (OUTPATIENT)
Dept: CARDIOLOGY | Facility: CLINIC | Age: 67
End: 2017-03-22

## 2023-08-18 ENCOUNTER — TRANSCRIBE ORDERS (OUTPATIENT)
Dept: PODIATRY | Facility: CLINIC | Age: 73
End: 2023-08-18
Payer: MEDICARE

## 2023-08-18 DIAGNOSIS — S92.514A CLOSED NONDISPLACED FRACTURE OF PROXIMAL PHALANX OF LESSER TOE OF RIGHT FOOT, INITIAL ENCOUNTER: Primary | ICD-10-CM

## 2023-08-29 ENCOUNTER — OFFICE VISIT (OUTPATIENT)
Dept: PODIATRY | Facility: CLINIC | Age: 73
End: 2023-08-29
Payer: MEDICARE

## 2023-08-29 VITALS
SYSTOLIC BLOOD PRESSURE: 142 MMHG | HEART RATE: 66 BPM | OXYGEN SATURATION: 97 % | DIASTOLIC BLOOD PRESSURE: 90 MMHG | HEIGHT: 69 IN | WEIGHT: 146 LBS | BODY MASS INDEX: 21.62 KG/M2

## 2023-08-29 DIAGNOSIS — M79.671 RIGHT FOOT PAIN: Primary | ICD-10-CM

## 2023-08-29 NOTE — PROGRESS NOTES
"Chas Andino  1950  72 y.o. male      08/29/2023    Chief Complaint   Patient presents with    Right Foot - Fracture       History of Present Illness    Chas Andino is a 72 y.o.male who reports to clinic today for urgent care follow up on right 4th toe fracture. Patient fell off of float in pool on 07/31/2023, and went to urgent care on 08/15/2023. They did x-rays there and did see a fracture. Patient states he still has swelling into has foot and feels it is healing slowly. X-rays obtained today.       Past Medical History:   Diagnosis Date    Bowel obstruction     Cervical cyst     C7    Hypertension          Past Surgical History:   Procedure Laterality Date    TOTAL KNEE ARTHROPLASTY           Family History   Problem Relation Age of Onset    Hypertension Father     Heart disease Father        No Known Allergies    Social History     Socioeconomic History    Marital status:    Tobacco Use    Smoking status: Never    Smokeless tobacco: Never   Substance and Sexual Activity    Alcohol use: No     Comment: social    Drug use: No    Sexual activity: Defer         Current Outpatient Medications   Medication Sig Dispense Refill    gabapentin (NEURONTIN) 400 MG capsule Take 1 capsule by mouth 3 (Three) Times a Day.      lisinopril (PRINIVIL,ZESTRIL) 40 MG tablet Take 1 tablet by mouth Daily. 30 tablet 11    Thyroid 65 MG PO tablet Take 1 tablet by mouth Daily.       No current facility-administered medications for this visit.       Review of Systems   Musculoskeletal:         Foot pain        OBJECTIVE    /90   Pulse 66   Ht 175.3 cm (69\")   Wt 66.2 kg (146 lb)   SpO2 97%   BMI 21.56 kg/mý     Physical Exam  Pulmonary:      Effort: Pulmonary effort is normal.   Musculoskeletal:      Right foot: Deformity present.        Feet:    Neurological:      Mental Status: He is alert.   Psychiatric:         Mood and Affect: Mood normal.                Procedures        ASSESSMENT AND PLAN    Diagnoses " and all orders for this visit:    1. Right foot pain (Primary)  -     XR Foot 3+ View Right        - Patient examined and evaluated  - Xrays taken and reviewed. Healing 4th proximal phalanx fracture  - Discussed treatment options for his fx of his fourth toe and also his arthritis to his First metatarsal joint.  - All questions were answered   - RTC as needed            This document has been electronically signed by Zbigniew Springer DPM on September 1, 2023 13:07 CDT     9/1/2023  13:07 CDT